# Patient Record
Sex: FEMALE | Race: WHITE | NOT HISPANIC OR LATINO | Employment: UNEMPLOYED | URBAN - NONMETROPOLITAN AREA
[De-identification: names, ages, dates, MRNs, and addresses within clinical notes are randomized per-mention and may not be internally consistent; named-entity substitution may affect disease eponyms.]

---

## 2020-02-11 ENCOUNTER — OFFICE VISIT (OUTPATIENT)
Dept: OBSTETRICS AND GYNECOLOGY | Facility: CLINIC | Age: 75
End: 2020-02-11

## 2020-02-11 VITALS
DIASTOLIC BLOOD PRESSURE: 75 MMHG | HEIGHT: 60 IN | BODY MASS INDEX: 31.41 KG/M2 | WEIGHT: 160 LBS | SYSTOLIC BLOOD PRESSURE: 130 MMHG

## 2020-02-11 DIAGNOSIS — N76.0 VAGINITIS AND VULVOVAGINITIS: Primary | ICD-10-CM

## 2020-02-11 DIAGNOSIS — R10.2 PERINEAL PAIN IN FEMALE: ICD-10-CM

## 2020-02-11 DIAGNOSIS — A60.04 HERPES, VULVOVAGINITIS: ICD-10-CM

## 2020-02-11 PROCEDURE — 99203 OFFICE O/P NEW LOW 30 MIN: CPT | Performed by: OBSTETRICS & GYNECOLOGY

## 2020-02-11 RX ORDER — GLIMEPIRIDE 4 MG/1
4 TABLET ORAL
COMMUNITY

## 2020-02-11 RX ORDER — HYDROCODONE BITARTRATE AND ACETAMINOPHEN 5; 325 MG/1; MG/1
1 TABLET ORAL EVERY 6 HOURS PRN
Qty: 10 TABLET | Refills: 0 | Status: SHIPPED | OUTPATIENT
Start: 2020-02-11

## 2020-02-11 RX ORDER — DIAZEPAM 10 MG/1
TABLET ORAL
COMMUNITY
Start: 2019-12-18

## 2020-02-11 RX ORDER — OMEPRAZOLE 40 MG/1
CAPSULE, DELAYED RELEASE ORAL
COMMUNITY

## 2020-02-11 RX ORDER — VALACYCLOVIR HYDROCHLORIDE 500 MG/1
1000 TABLET, FILM COATED ORAL 2 TIMES DAILY
Qty: 20 TABLET | Refills: 0 | Status: SHIPPED | OUTPATIENT
Start: 2020-02-11 | End: 2020-02-21

## 2020-02-11 RX ORDER — PROPRANOLOL HYDROCHLORIDE 80 MG/1
80 CAPSULE, EXTENDED RELEASE ORAL DAILY
COMMUNITY

## 2020-02-11 RX ORDER — LEVOTHYROXINE AND LIOTHYRONINE 57; 13.5 UG/1; UG/1
90 TABLET ORAL DAILY
COMMUNITY

## 2020-02-11 NOTE — PROGRESS NOTES
Ailyn Siu is a 74 y.o. y/o female.     Chief Complaint: Severe vaginal and perineal pain    HPI:   74 y.o. .  No LMP recorded. Patient has had a hysterectomy..  Patient states that for the last 7 days she has had been having severe vaginal and perineal pain.  Has seen her family doctor and was told that she has a yeast infection.  Was treated with Diflucan but things are not improving.  States that pain is so severe she cannot wipe after urination and urination burns significantly.  Has not gotten any relief.  Patient is here for further evaluation.     Review of Systems   Constitutional: Negative for chills, fatigue and fever.   HENT: Negative for sore throat.    Eyes: Negative for visual disturbance.   Respiratory: Negative for cough, shortness of breath and wheezing.    Cardiovascular: Negative for chest pain, palpitations and leg swelling.   Gastrointestinal: Negative for abdominal pain, diarrhea, nausea and vomiting.   Genitourinary: Positive for dysuria and vaginal pain. Negative for flank pain, frequency, vaginal bleeding and vaginal discharge.   Neurological: Negative for syncope, light-headedness and headaches.   Psychiatric/Behavioral: Negative for dysphoric mood and suicidal ideas. The patient is not nervous/anxious.         The following portions of the patient's history were reviewed and updated as appropriate: allergies, current medications, past family history, past medical history, past social history, past surgical history and problem list.    No Known Allergies     Prior to Admission medications    Medication Sig Start Date End Date Taking? Authorizing Provider   diazePAM (VALIUM) 10 MG tablet TAKE 1 TABLET DAILY 19  Yes ProviderEliana MD   glimepiride (AMARYL) 4 MG tablet Take 4 mg by mouth.   Yes ProviderEliana MD   metFORMIN (GLUCOPHAGE) 1000 MG tablet Take 1,000 mg by mouth.   Yes ProviderEliana MD   omeprazole (priLOSEC) 40 MG capsule    Yes Provider  "MD Eliana   propranolol LA (INDERAL LA) 80 MG 24 hr capsule Take 80 mg by mouth Daily.   Yes ProviderEliana MD   Semaglutide,0.25 or 0.5MG/DOS, (OZEMPIC) 2 MG/1.5ML solution pen-injector Inject 0.5 mg under the skin into the appropriate area as directed Every 7 (Seven) Days. 10/31/19  Yes ProviderEliana MD   Thyroid 90 MG PO tablet Take 90 mg by mouth Daily.   Yes ProviderEliana MD   HYDROcodone-acetaminophen (NORCO) 5-325 MG per tablet Take 1 tablet by mouth Every 6 (Six) Hours As Needed for Severe Pain . 20   Tai Contreras DO   lidocaine (XYLOCAINE) 2 % jelly Apply  topically to the appropriate area as directed 3 (Three) Times a Day. 20   Tai Contreras,    valACYclovir (VALTREX) 500 MG tablet Take 2 tablets by mouth 2 (Two) Times a Day for 10 days. 20  Tai Contreras DO        The patient has a family history of   Family History   Problem Relation Age of Onset   • Diabetes Father         Past Medical History:   Diagnosis Date   • Diabetes mellitus (CMS/HCC)         OB History        5    Para   3    Term                AB   2    Living   3       SAB   1    TAB        Ectopic   1    Molar        Multiple        Live Births                     Social History     Socioeconomic History   • Marital status:      Spouse name: Not on file   • Number of children: Not on file   • Years of education: Not on file   • Highest education level: Not on file   Tobacco Use   • Smoking status: Never Smoker   Substance and Sexual Activity   • Alcohol use: Never     Frequency: Never   • Drug use: Never        Past Surgical History:   Procedure Laterality Date   •  SECTION     • TOTAL ABDOMINAL HYSTERECTOMY          There is no problem list on file for this patient.       Documented Vitals    20 0931   BP: 130/75   Weight: 72.6 kg (160 lb)   Height: 152.4 cm (60\")   PainSc: 0-No pain        Body mass index is 31.25 " kg/m².    Physical Exam   Constitutional: She is oriented to person, place, and time. She appears well-developed and well-nourished.   HENT:   Head: Normocephalic.   Genitourinary: Vaginal discharge found.   Genitourinary Comments: One swab obtained without difficulty.  Entire perineum erythematous.  Labia minora with multiple herpetic lesions noted and purulent discharge.   Neurological: She is alert and oriented to person, place, and time.   Skin: Skin is warm and dry.   Psychiatric: She has a normal mood and affect. Her behavior is normal. Judgment and thought content normal.   Vitals reviewed.      Laboratory Data:   No results for input(s): GLUCOSE, BUN, CREATININE, NA, K, CL, CO2, CALCIUM, PROTEINTOT, ALBUMIN, ALT, AST, ALKPHOS, BILITOT, EGFRIFNONA, GLOB, AGRATIO, BCR, ANIONGAP, BILIDIR, INDBILI in the last 07421 hours.  No results for input(s): WBC, RBC, HGB, HCT, MCV, MCH, MCHC, RDW, RDWSD, MPV, PLT in the last 55796 hours.  No results for input(s): HCGQUAL in the last 11742 hours.    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  One swab obtained to confirm diagnosis.  Plan to start patient on Valtrex 1000 mg twice daily for 10 days.  Lidocaine jelly given for pain control and Norco also given for pain control.  Plan for patient to return to see me in 2 weeks to verify improvement of symptoms.  Patient to return sooner as needed.     Diagnosis Plan   1. Vaginitis and vulvovaginitis  HYDROcodone-acetaminophen (NORCO) 5-325 MG per tablet   2. Perineal pain in female  HYDROcodone-acetaminophen (NORCO) 5-325 MG per tablet   3. Herpes, vulvovaginitis  HYDROcodone-acetaminophen (NORCO) 5-325 MG per tablet         Plan         New Medications Ordered This Visit   Medications   • valACYclovir (VALTREX) 500 MG tablet     Sig: Take 2 tablets by mouth 2 (Two) Times a Day for 10 days.     Dispense:  20 tablet     Refill:  0   • lidocaine (XYLOCAINE) 2 % jelly     Sig: Apply   topically to the appropriate area as directed 3 (Three) Times a Day.     Dispense:  1 tube     Refill:  1   • HYDROcodone-acetaminophen (NORCO) 5-325 MG per tablet     Sig: Take 1 tablet by mouth Every 6 (Six) Hours As Needed for Severe Pain .     Dispense:  10 tablet     Refill:  0             This document has been electronically signed by Tai Contreras DO on February 11, 2020 10:15 AM

## 2020-02-24 ENCOUNTER — TELEPHONE (OUTPATIENT)
Dept: OBSTETRICS AND GYNECOLOGY | Facility: CLINIC | Age: 75
End: 2020-02-24

## 2020-02-25 ENCOUNTER — TELEPHONE (OUTPATIENT)
Dept: OBSTETRICS AND GYNECOLOGY | Facility: CLINIC | Age: 75
End: 2020-02-25

## 2020-02-25 NOTE — TELEPHONE ENCOUNTER
Have tried to contact patient several times regarding test results. Patient does not have a current working number. I mailed patient a letter to please contact the office regarding results.

## 2020-02-26 RX ORDER — METRONIDAZOLE 500 MG/1
500 TABLET ORAL 2 TIMES DAILY
Qty: 14 TABLET | Refills: 0 | Status: SHIPPED | OUTPATIENT
Start: 2020-02-26 | End: 2020-03-04

## 2020-03-06 DIAGNOSIS — N89.8 VAGINAL DISCHARGE: Primary | ICD-10-CM

## 2020-03-10 DIAGNOSIS — N89.8 VAGINAL DISCHARGE: ICD-10-CM

## 2020-08-24 ENCOUNTER — TELEPHONE (OUTPATIENT)
Dept: OBSTETRICS AND GYNECOLOGY | Facility: CLINIC | Age: 75
End: 2020-08-24

## 2020-08-24 RX ORDER — VALACYCLOVIR HYDROCHLORIDE 1 G/1
1000 TABLET, FILM COATED ORAL DAILY
Qty: 5 TABLET | Refills: 6 | Status: SHIPPED | OUTPATIENT
Start: 2020-08-24